# Patient Record
Sex: FEMALE | Race: WHITE | URBAN - METROPOLITAN AREA
[De-identification: names, ages, dates, MRNs, and addresses within clinical notes are randomized per-mention and may not be internally consistent; named-entity substitution may affect disease eponyms.]

---

## 2018-05-08 ENCOUNTER — HISTORICAL (OUTPATIENT)
Dept: RADIOLOGY | Facility: HOSPITAL | Age: 83
End: 2018-05-08

## 2018-05-09 ENCOUNTER — HISTORICAL (OUTPATIENT)
Dept: RADIOLOGY | Facility: HOSPITAL | Age: 83
End: 2018-05-09

## 2018-05-31 ENCOUNTER — HISTORICAL (OUTPATIENT)
Dept: INFUSION THERAPY | Facility: HOSPITAL | Age: 83
End: 2018-05-31

## 2018-06-26 ENCOUNTER — HISTORICAL (OUTPATIENT)
Dept: LAB | Facility: HOSPITAL | Age: 83
End: 2018-06-26

## 2019-01-24 ENCOUNTER — HISTORICAL (OUTPATIENT)
Dept: INFUSION THERAPY | Facility: HOSPITAL | Age: 84
End: 2019-01-24

## 2019-01-30 ENCOUNTER — HISTORICAL (OUTPATIENT)
Dept: INFUSION THERAPY | Facility: HOSPITAL | Age: 84
End: 2019-01-30

## 2019-02-06 ENCOUNTER — HISTORICAL (OUTPATIENT)
Dept: INFUSION THERAPY | Facility: HOSPITAL | Age: 84
End: 2019-02-06

## 2019-02-20 ENCOUNTER — HISTORICAL (OUTPATIENT)
Dept: INFUSION THERAPY | Facility: HOSPITAL | Age: 84
End: 2019-02-20

## 2019-02-22 ENCOUNTER — HISTORICAL (OUTPATIENT)
Dept: ADMINISTRATIVE | Facility: HOSPITAL | Age: 84
End: 2019-02-22

## 2019-03-13 ENCOUNTER — HISTORICAL (OUTPATIENT)
Dept: INFUSION THERAPY | Facility: HOSPITAL | Age: 84
End: 2019-03-13

## 2019-04-24 LAB
ABS NEUT (OLG): 6.64 X10(3)/MCL (ref 2.1–9.2)
BASOPHILS # BLD AUTO: 0 X10(3)/MCL (ref 0–0.2)
BASOPHILS NFR BLD AUTO: 0.2 %
CROSSMATCH INTERPRETATION: NORMAL
EOSINOPHIL # BLD AUTO: 0 X10(3)/MCL (ref 0–0.9)
EOSINOPHIL NFR BLD AUTO: 0.2 %
ERYTHROCYTE [DISTWIDTH] IN BLOOD BY AUTOMATED COUNT: 17.2 % (ref 11.5–17)
GROUP & RH: NORMAL
HCT VFR BLD AUTO: 19.2 % (ref 37–47)
HGB BLD-MCNC: 5.7 GM/DL (ref 12–16)
LYMPHOCYTES # BLD AUTO: 0.8 X10(3)/MCL (ref 0.6–4.6)
LYMPHOCYTES NFR BLD AUTO: 10 %
MCH RBC QN AUTO: 26.8 PG (ref 27–31)
MCHC RBC AUTO-ENTMCNC: 29.7 GM/DL (ref 33–36)
MCV RBC AUTO: 90.1 FL (ref 80–94)
MONOCYTES # BLD AUTO: 0.8 X10(3)/MCL (ref 0.1–1.3)
MONOCYTES NFR BLD AUTO: 9.1 %
NEUTROPHILS # BLD AUTO: 6.6 X10(3)/MCL (ref 2.1–9.2)
NEUTROPHILS NFR BLD AUTO: 80.3 %
PLATELET # BLD AUTO: 252 X10(3)/MCL (ref 130–400)
PMV BLD AUTO: 8 FL (ref 9.4–12.4)
PRODUCT READY: NORMAL
PRODUCT READY: NORMAL
RBC # BLD AUTO: 2.13 X10(6)/MCL (ref 4.2–5.4)
TRANSFUSION ORDER: NORMAL
TRANSFUSION ORDER: NORMAL
WBC # SPEC AUTO: 8.3 X10(3)/MCL (ref 4.5–11.5)

## 2019-04-25 ENCOUNTER — HISTORICAL (OUTPATIENT)
Dept: INFUSION THERAPY | Facility: HOSPITAL | Age: 84
End: 2019-04-25

## 2019-05-01 ENCOUNTER — HISTORICAL (OUTPATIENT)
Dept: ADMINISTRATIVE | Facility: HOSPITAL | Age: 84
End: 2019-05-01

## 2019-05-01 LAB
ABS NEUT (OLG): 6.9 X10(3)/MCL (ref 2.1–9.2)
ALBUMIN SERPL-MCNC: 1.9 GM/DL (ref 3.4–5)
ALBUMIN/GLOB SERPL: 0.5 RATIO (ref 1.1–2)
ALP SERPL-CCNC: 94 UNIT/L (ref 38–126)
ALT SERPL-CCNC: 35 UNIT/L (ref 12–78)
AST SERPL-CCNC: 50 UNIT/L (ref 15–37)
BASOPHILS # BLD AUTO: 0 X10(3)/MCL (ref 0–0.2)
BASOPHILS NFR BLD AUTO: 0.4 %
BILIRUB SERPL-MCNC: 0.6 MG/DL (ref 0.2–1)
BILIRUBIN DIRECT+TOT PNL SERPL-MCNC: 0.3 MG/DL (ref 0–0.5)
BILIRUBIN DIRECT+TOT PNL SERPL-MCNC: 0.3 MG/DL (ref 0–0.8)
BUN SERPL-MCNC: 13 MG/DL (ref 7–18)
CALCIUM SERPL-MCNC: 9 MG/DL (ref 8.5–10.1)
CHLORIDE SERPL-SCNC: 95 MMOL/L (ref 98–107)
CO2 SERPL-SCNC: 33 MMOL/L (ref 21–32)
CREAT SERPL-MCNC: 0.42 MG/DL (ref 0.55–1.02)
EOSINOPHIL # BLD AUTO: 0 X10(3)/MCL (ref 0–0.9)
EOSINOPHIL NFR BLD AUTO: 0.2 %
ERYTHROCYTE [DISTWIDTH] IN BLOOD BY AUTOMATED COUNT: 15.8 % (ref 11.5–17)
GLOBULIN SER-MCNC: 3.5 GM/DL (ref 2.4–3.5)
GLUCOSE SERPL-MCNC: 130 MG/DL (ref 74–106)
HCT VFR BLD AUTO: 31.9 % (ref 37–47)
HGB BLD-MCNC: 9.7 GM/DL (ref 12–16)
LYMPHOCYTES # BLD AUTO: 0.8 X10(3)/MCL (ref 0.6–4.6)
LYMPHOCYTES NFR BLD AUTO: 9.4 %
MCH RBC QN AUTO: 27.6 PG (ref 27–31)
MCHC RBC AUTO-ENTMCNC: 30.4 GM/DL (ref 33–36)
MCV RBC AUTO: 90.6 FL (ref 80–94)
MONOCYTES # BLD AUTO: 0.8 X10(3)/MCL (ref 0.1–1.3)
MONOCYTES NFR BLD AUTO: 9 %
NEUTROPHILS # BLD AUTO: 6.9 X10(3)/MCL (ref 2.1–9.2)
NEUTROPHILS NFR BLD AUTO: 80.6 %
PLATELET # BLD AUTO: 266 X10(3)/MCL (ref 130–400)
PMV BLD AUTO: 8.2 FL (ref 9.4–12.4)
POTASSIUM SERPL-SCNC: 3.6 MMOL/L (ref 3.5–5.1)
PROT SERPL-MCNC: 5.4 GM/DL (ref 6.4–8.2)
RBC # BLD AUTO: 3.52 X10(6)/MCL (ref 4.2–5.4)
SODIUM SERPL-SCNC: 134 MMOL/L (ref 136–145)
WBC # SPEC AUTO: 8.6 X10(3)/MCL (ref 4.5–11.5)

## 2019-05-08 ENCOUNTER — HISTORICAL (OUTPATIENT)
Dept: INFUSION THERAPY | Facility: HOSPITAL | Age: 84
End: 2019-05-08

## 2019-05-08 LAB
ABS NEUT (OLG): 7.59 X10(3)/MCL (ref 2.1–9.2)
BASOPHILS # BLD AUTO: 0 X10(3)/MCL (ref 0–0.2)
BASOPHILS NFR BLD AUTO: 0.2 %
EOSINOPHIL # BLD AUTO: 0 X10(3)/MCL (ref 0–0.9)
EOSINOPHIL NFR BLD AUTO: 0.1 %
ERYTHROCYTE [DISTWIDTH] IN BLOOD BY AUTOMATED COUNT: 16 % (ref 11.5–17)
HCT VFR BLD AUTO: 33.1 % (ref 37–47)
HGB BLD-MCNC: 10.1 GM/DL (ref 12–16)
LYMPHOCYTES # BLD AUTO: 1.4 X10(3)/MCL (ref 0.6–4.6)
LYMPHOCYTES NFR BLD AUTO: 14.3 %
MCH RBC QN AUTO: 26.7 PG (ref 27–31)
MCHC RBC AUTO-ENTMCNC: 30.5 GM/DL (ref 33–36)
MCV RBC AUTO: 87.6 FL (ref 80–94)
MONOCYTES # BLD AUTO: 0.8 X10(3)/MCL (ref 0.1–1.3)
MONOCYTES NFR BLD AUTO: 7.9 %
NEUTROPHILS # BLD AUTO: 7.6 X10(3)/MCL (ref 2.1–9.2)
NEUTROPHILS NFR BLD AUTO: 77.2 %
PLATELET # BLD AUTO: 348 X10(3)/MCL (ref 130–400)
PMV BLD AUTO: 7.8 FL (ref 9.4–12.4)
RBC # BLD AUTO: 3.78 X10(6)/MCL (ref 4.2–5.4)
WBC # SPEC AUTO: 9.8 X10(3)/MCL (ref 4.5–11.5)

## 2019-05-22 ENCOUNTER — HISTORICAL (OUTPATIENT)
Dept: INFUSION THERAPY | Facility: HOSPITAL | Age: 84
End: 2019-05-22

## 2019-05-22 LAB
ABS NEUT (OLG): 8.94 X10(3)/MCL (ref 2.1–9.2)
BASOPHILS # BLD AUTO: 0 X10(3)/MCL (ref 0–0.2)
BASOPHILS NFR BLD AUTO: 0.1 %
ERYTHROCYTE [DISTWIDTH] IN BLOOD BY AUTOMATED COUNT: 16.9 % (ref 11.5–17)
HCT VFR BLD AUTO: 35.3 % (ref 37–47)
HGB BLD-MCNC: 10.6 GM/DL (ref 12–16)
LYMPHOCYTES # BLD AUTO: 0.9 X10(3)/MCL (ref 0.6–4.6)
LYMPHOCYTES NFR BLD AUTO: 8.2 %
MCH RBC QN AUTO: 25.8 PG (ref 27–31)
MCHC RBC AUTO-ENTMCNC: 30 GM/DL (ref 33–36)
MCV RBC AUTO: 85.9 FL (ref 80–94)
MONOCYTES # BLD AUTO: 0.7 X10(3)/MCL (ref 0.1–1.3)
MONOCYTES NFR BLD AUTO: 6.7 %
NEUTROPHILS # BLD AUTO: 8.9 X10(3)/MCL (ref 2.1–9.2)
NEUTROPHILS NFR BLD AUTO: 84.7 %
PLATELET # BLD AUTO: 256 X10(3)/MCL (ref 130–400)
PMV BLD AUTO: 8.8 FL (ref 9.4–12.4)
RBC # BLD AUTO: 4.11 X10(6)/MCL (ref 4.2–5.4)
WBC # SPEC AUTO: 10.6 X10(3)/MCL (ref 4.5–11.5)

## 2019-05-30 ENCOUNTER — HISTORICAL (OUTPATIENT)
Dept: ADMINISTRATIVE | Facility: HOSPITAL | Age: 84
End: 2019-05-30

## 2019-05-30 LAB
ABS NEUT (OLG): 7.32 X10(3)/MCL (ref 2.1–9.2)
ALBUMIN SERPL-MCNC: 1.7 GM/DL (ref 3.4–5)
ALBUMIN/GLOB SERPL: 0.5 RATIO (ref 1.1–2)
ALP SERPL-CCNC: 96 UNIT/L (ref 38–126)
ALT SERPL-CCNC: 26 UNIT/L (ref 12–78)
AST SERPL-CCNC: 60 UNIT/L (ref 15–37)
BILIRUB SERPL-MCNC: 0.6 MG/DL (ref 0.2–1)
BILIRUBIN DIRECT+TOT PNL SERPL-MCNC: 0.3 MG/DL (ref 0–0.5)
BILIRUBIN DIRECT+TOT PNL SERPL-MCNC: 0.3 MG/DL (ref 0–0.8)
BUN SERPL-MCNC: 18 MG/DL (ref 7–18)
CALCIUM SERPL-MCNC: 8.7 MG/DL (ref 8.5–10.1)
CHLORIDE SERPL-SCNC: 99 MMOL/L (ref 98–107)
CO2 SERPL-SCNC: 28 MMOL/L (ref 21–32)
CREAT SERPL-MCNC: 0.61 MG/DL (ref 0.55–1.02)
ERYTHROCYTE [DISTWIDTH] IN BLOOD BY AUTOMATED COUNT: 17.7 % (ref 11.5–17)
GLOBULIN SER-MCNC: 3.7 GM/DL (ref 2.4–3.5)
GLUCOSE SERPL-MCNC: 208 MG/DL (ref 74–106)
HCT VFR BLD AUTO: 31.4 % (ref 37–47)
HGB BLD-MCNC: 9.4 GM/DL (ref 12–16)
LYMPHOCYTES # BLD AUTO: 0.4 X10(3)/MCL (ref 0.6–4.6)
LYMPHOCYTES NFR BLD AUTO: 5.3 %
MCH RBC QN AUTO: 25.8 PG (ref 27–31)
MCHC RBC AUTO-ENTMCNC: 29.9 GM/DL (ref 33–36)
MCV RBC AUTO: 86.3 FL (ref 80–94)
MONOCYTES # BLD AUTO: 0.5 X10(3)/MCL (ref 0.1–1.3)
MONOCYTES NFR BLD AUTO: 6.5 %
NEUTROPHILS # BLD AUTO: 7.3 X10(3)/MCL (ref 2.1–9.2)
NEUTROPHILS NFR BLD AUTO: 88 %
PLATELET # BLD AUTO: 238 X10(3)/MCL (ref 130–400)
PMV BLD AUTO: 8.5 FL (ref 9.4–12.4)
POTASSIUM SERPL-SCNC: 3.2 MMOL/L (ref 3.5–5.1)
PROT SERPL-MCNC: 5.4 GM/DL (ref 6.4–8.2)
RBC # BLD AUTO: 3.64 X10(6)/MCL (ref 4.2–5.4)
SODIUM SERPL-SCNC: 137 MMOL/L (ref 136–145)
WBC # SPEC AUTO: 8.3 X10(3)/MCL (ref 4.5–11.5)

## 2019-06-05 ENCOUNTER — HISTORICAL (OUTPATIENT)
Dept: HEMATOLOGY/ONCOLOGY | Facility: CLINIC | Age: 84
End: 2019-06-05

## 2019-06-05 LAB
ABS NEUT (OLG): 8.66 X10(3)/MCL (ref 2.1–9.2)
EOSINOPHIL # BLD AUTO: 0 X10(3)/MCL (ref 0–0.9)
EOSINOPHIL NFR BLD AUTO: 0.1 %
ERYTHROCYTE [DISTWIDTH] IN BLOOD BY AUTOMATED COUNT: 18.3 % (ref 11.5–17)
HCT VFR BLD AUTO: 32.3 % (ref 37–47)
HGB BLD-MCNC: 9.6 GM/DL (ref 12–16)
LYMPHOCYTES # BLD AUTO: 0.9 X10(3)/MCL (ref 0.6–4.6)
LYMPHOCYTES NFR BLD AUTO: 8.4 %
MCH RBC QN AUTO: 25.4 PG (ref 27–31)
MCHC RBC AUTO-ENTMCNC: 29.7 GM/DL (ref 33–36)
MCV RBC AUTO: 85.4 FL (ref 80–94)
MONOCYTES # BLD AUTO: 0.7 X10(3)/MCL (ref 0.1–1.3)
MONOCYTES NFR BLD AUTO: 6.5 %
NEUTROPHILS # BLD AUTO: 8.7 X10(3)/MCL (ref 2.1–9.2)
NEUTROPHILS NFR BLD AUTO: 84.7 %
PLATELET # BLD AUTO: 244 X10(3)/MCL (ref 130–400)
PMV BLD AUTO: 8.8 FL (ref 9.4–12.4)
RBC # BLD AUTO: 3.78 X10(6)/MCL (ref 4.2–5.4)
WBC # SPEC AUTO: 10.2 X10(3)/MCL (ref 4.5–11.5)

## 2019-06-19 LAB
ABS NEUT (OLG): 9.22 X10(3)/MCL (ref 2.1–9.2)
BASOPHILS # BLD AUTO: 0 X10(3)/MCL (ref 0–0.2)
BASOPHILS NFR BLD AUTO: 0.1 %
CROSSMATCH INTERPRETATION: NORMAL
ERYTHROCYTE [DISTWIDTH] IN BLOOD BY AUTOMATED COUNT: 20.1 % (ref 11.5–17)
GROUP & RH: NORMAL
HCT VFR BLD AUTO: 22.9 % (ref 37–47)
HGB BLD-MCNC: 6.6 GM/DL (ref 12–16)
LYMPHOCYTES # BLD AUTO: 0.6 X10(3)/MCL (ref 0.6–4.6)
LYMPHOCYTES NFR BLD AUTO: 5.5 %
MCH RBC QN AUTO: 25.6 PG (ref 27–31)
MCHC RBC AUTO-ENTMCNC: 28.8 GM/DL (ref 33–36)
MCV RBC AUTO: 88.8 FL (ref 80–94)
MONOCYTES # BLD AUTO: 0.6 X10(3)/MCL (ref 0.1–1.3)
MONOCYTES NFR BLD AUTO: 6 %
NEUTROPHILS # BLD AUTO: 9.2 X10(3)/MCL (ref 2.1–9.2)
NEUTROPHILS NFR BLD AUTO: 88.1 %
PLATELET # BLD AUTO: 253 X10(3)/MCL (ref 130–400)
PMV BLD AUTO: 9.4 FL (ref 9.4–12.4)
PRODUCT READY: NORMAL
RBC # BLD AUTO: 2.58 X10(6)/MCL (ref 4.2–5.4)
TRANSFUSION ORDER: NORMAL
WBC # SPEC AUTO: 10.5 X10(3)/MCL (ref 4.5–11.5)

## 2019-06-20 ENCOUNTER — HISTORICAL (OUTPATIENT)
Dept: INFUSION THERAPY | Facility: HOSPITAL | Age: 84
End: 2019-06-20

## 2019-07-03 LAB
ABS NEUT (OLG): 9.58 X10(3)/MCL (ref 2.1–9.2)
BASOPHILS # BLD AUTO: 0.1 X10(3)/MCL (ref 0–0.2)
BASOPHILS NFR BLD AUTO: 0.9 %
CROSSMATCH INTERPRETATION: NORMAL
EOSINOPHIL # BLD AUTO: 0 X10(3)/MCL (ref 0–0.9)
EOSINOPHIL NFR BLD AUTO: 0.3 %
ERYTHROCYTE [DISTWIDTH] IN BLOOD BY AUTOMATED COUNT: 19.2 % (ref 11.5–17)
GROUP & RH: NORMAL
HCT VFR BLD AUTO: 27 % (ref 37–47)
HGB BLD-MCNC: 7.6 GM/DL (ref 12–16)
LYMPHOCYTES # BLD AUTO: 0.9 X10(3)/MCL (ref 0.6–4.6)
LYMPHOCYTES NFR BLD AUTO: 8 %
MCH RBC QN AUTO: 25.9 PG (ref 27–31)
MCHC RBC AUTO-ENTMCNC: 28.1 GM/DL (ref 33–36)
MCV RBC AUTO: 92.2 FL (ref 80–94)
MONOCYTES # BLD AUTO: 0.7 X10(3)/MCL (ref 0.1–1.3)
MONOCYTES NFR BLD AUTO: 5.8 %
NEUTROPHILS # BLD AUTO: 9.6 X10(3)/MCL (ref 2.1–9.2)
NEUTROPHILS NFR BLD AUTO: 84.7 %
PLATELET # BLD AUTO: 253 X10(3)/MCL (ref 130–400)
PMV BLD AUTO: 8.8 FL (ref 9.4–12.4)
PRODUCT READY: NORMAL
RBC # BLD AUTO: 2.93 X10(6)/MCL (ref 4.2–5.4)
TRANSFUSION ORDER: NORMAL
WBC # SPEC AUTO: 11.3 X10(3)/MCL (ref 4.5–11.5)

## 2019-07-05 ENCOUNTER — HISTORICAL (OUTPATIENT)
Dept: INFUSION THERAPY | Facility: HOSPITAL | Age: 84
End: 2019-07-05

## 2019-07-17 ENCOUNTER — HISTORICAL (OUTPATIENT)
Dept: INFUSION THERAPY | Facility: HOSPITAL | Age: 84
End: 2019-07-17

## 2019-07-17 LAB
ABS NEUT (OLG): 7.94 X10(3)/MCL (ref 2.1–9.2)
BASOPHILS # BLD AUTO: 0 X10(3)/MCL (ref 0–0.2)
BASOPHILS NFR BLD AUTO: 0.1 %
CROSSMATCH INTERPRETATION: NORMAL
EOSINOPHIL # BLD AUTO: 0 X10(3)/MCL (ref 0–0.9)
EOSINOPHIL NFR BLD AUTO: 0.1 %
ERYTHROCYTE [DISTWIDTH] IN BLOOD BY AUTOMATED COUNT: 18.4 % (ref 11.5–17)
GROUP & RH: NORMAL
HCT VFR BLD AUTO: 24.8 % (ref 37–47)
HGB BLD-MCNC: 7.2 GM/DL (ref 12–16)
LYMPHOCYTES # BLD AUTO: 0.7 X10(3)/MCL (ref 0.6–4.6)
LYMPHOCYTES NFR BLD AUTO: 7.2 %
MCH RBC QN AUTO: 27 PG (ref 27–31)
MCHC RBC AUTO-ENTMCNC: 29 GM/DL (ref 33–36)
MCV RBC AUTO: 92.9 FL (ref 80–94)
MONOCYTES # BLD AUTO: 0.6 X10(3)/MCL (ref 0.1–1.3)
MONOCYTES NFR BLD AUTO: 6.4 %
NEUTROPHILS # BLD AUTO: 7.9 X10(3)/MCL (ref 2.1–9.2)
NEUTROPHILS NFR BLD AUTO: 85.9 %
PLATELET # BLD AUTO: 240 X10(3)/MCL (ref 130–400)
PMV BLD AUTO: 8.7 FL (ref 9.4–12.4)
PRODUCT READY: NORMAL
RBC # BLD AUTO: 2.67 X10(6)/MCL (ref 4.2–5.4)
TRANSFUSION ORDER: NORMAL
WBC # SPEC AUTO: 9.2 X10(3)/MCL (ref 4.5–11.5)

## 2019-07-31 ENCOUNTER — HISTORICAL (OUTPATIENT)
Dept: INFUSION THERAPY | Facility: HOSPITAL | Age: 84
End: 2019-07-31

## 2019-08-14 ENCOUNTER — HISTORICAL (OUTPATIENT)
Dept: INFUSION THERAPY | Facility: HOSPITAL | Age: 84
End: 2019-08-14

## 2022-04-28 NOTE — PROGRESS NOTES
Patient:   Sylvia Palacios             MRN: [id number]            FIN: [id number]               Age:   89 years     Sex:  Female     :  1929   Associated Diagnoses:   Anemia   Author:   Tamara Corbin MD      Visit Information   Initial Consultation:18  Referring Physician:Dr Winters  Other Physicians:  Code Status:Not addressed    Diagnosis/Problem list:   Iron Deficiency Anemia    Present Treatment:    Treatment history:      Plan of care: Anemia w/u    Clinical History: Mrs. Palacios is an 89-year-old female referred by Dr. Winters for anemia. She has a past medical history of rheumatoid arthritis, coronary artery disease, hypertension and TIA.    In May of this year, she presented with left-sided chest pain and dark stools. Cardiac symptoms are negative. She was transferred from the [facility] to our [facility] for GI workup. She reported black stools during the month prior. She was taking oral iron which she had been on for years but the dark stools relatively new. She is also taking 325 mg of aspirin daily EGD that admission was normal and colonoscopy found moderate diverticulosis, no bleeding, masses or polyps seen. There were small external hemorrhoids. During that admission in May 2018, hemoglobin ranged from 7.6-8.6 g/dL and hemoglobin was 8.1 g/dL at the time of discharge on 2018. MCV was normal in the mid 80s. Iron level was 16, percent saturation 5%, iron binding capacity was normal at 355 and ferritin was normal at 30.3. She did receive Injectafer for infusion while in the hospital and received another one a week or so after discharge.    In early 2018, reportedly she started having dark stools again. Labs from 2018 show hemoglobin of 8.9 which was improved from hemoglobin of 8.1 g/dL when she was discharged from hospital one week prior to that. On 2018, the patient saw Dr. Schaefer's PA and the plan was to do M2A capsule study.     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     Denies any complaints at all. She is doing well "for my age". She denies any fever, chills, sweats. No chest pain or shortness of breath. She does report dark stools from time to time. No chest pain or shortness of breath. A little bit of fatigue and tiredness.      Initial workup:MM work up was negative. Lupus and RIKI both positive.   Peripheral smear: Moderate normocytic, normochromic anemia with little variation in red cell size and shape. Red cell morphologic variants include ovalocytes and acanthocytes. No schistocytes are seen. Probably from Kathy is present. Leukocytes are normal in number, differential and morphology. A few reactive lymphocytes are present.  The normal in number and morphology. Comment: Normocystosis in the setting of a normal RDW may be seen with anemia of chronic disease, some hemoglobinopathy traits acute bleed.    She had an EGD with  M2A capsule study in August 2018. When we saw her initially, we did not have these results at that time. 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    She was transfused 2 units of PRBCs ON 1/24/19 and followed by Injectafer on 1/30/19 & 2/6/19.      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 She does see minimal amounts of blood in her stool intermittently. Iron profile indicates anemia of chronic disease.      Chief Complaint   5/1/2019 9:45 CDT        Tiredness. left knee pain      Interval History   Patient presents today for her follow-up visit accompanied by her daughter.    Bone marrow biopsy 2/22/19: Normocytic anemia with 30% cellular bone marrow showing mild erythroid hyperplasia and mildly increased marrow iron stores suggestive of anemia of chronic disease. No evidence of definitive myeloma,  dysplasia or increased blast identified. MDS Fish panel negative for MDS related genetic abnormality.     Today, her hemoglobin is 9.7.  She still complains of fatigue.  " She has not seen any bright red blood per rectum lately, but son and daughter report that she is always having black stool and some blood. Since last visit she was admitted to the hospital, [facility], in 3/19,  for UTI. She has transfusion and iron as well there. I do not have the records. About a week or so after she was admitted again but for heart failure and she is on Digoxin, lasix and metoprolol now.   She was transfused 3 units of PRBCs 4/24/19.  Patient denies any fever, chills, sweats.  She reports left knee pain that this is chronic and is not new.  Patient looks debilitated and frail.  She is in a wheelchair.  She reports that she does not do much anymore.  Seems like she is having a little bit that was evident through the interview.  History taken from her daughter and son      Review of Systems   All 12 points of the review of systems were obtained and pertinent as per above history      Health Status   Allergies:    Allergic Reactions (All)  No Known Medication Allergies,    Allergies (1) Active Reaction  No Known Medication Allergies None Documented   Current medications:  (Selected)   Outpatient Medications  Ordered  Benadryl (for IVPB): 25 mg, IV Piggyback, Once-chemo, Infuse over: 20 minute(s), first dose 04/25/19 10:46:00 CDT, stop date 04/25/19 10:46:00 CDT, Pre-Med for transfusion, Days 1  Tylenol: 650 mg, form: Tab, Oral, Once-chemo, first dose 01/24/19 12:00:00 CST, stop date 01/24/19 12:00:00 CST, Routine, Pre-Med for Transfusion, Days 1  Future  Aranesp inj. - CCA: 300 mcg, form: Soln, Subcutaneous, Once-chemo, first dose 05/08/19 11:00:00 CDT, stop date 05/08/19 11:00:00 CDT, Future Order, Weeks 5  Aranesp inj. - CCA: 300 mcg, form: Soln, Subcutaneous, Once-chemo, first dose 05/22/19 11:00:00 CDT, stop date 05/22/19 11:00:00 CDT, Future Order, Weeks 7  Aranesp inj. - CCA: 300 mcg, form: Soln, Subcutaneous, Once-chemo, first dose 06/05/19 11:00:00 CDT, stop date 06/05/19 11:00:00 CDT, Future  Order, Weeks 9  Aranesp inj. - CCA: 300 mcg, form: Soln, Subcutaneous, Once-chemo, first dose 06/19/19 11:00:00 CDT, stop date 06/19/19 11:00:00 CDT, Future Order, Weeks 11  Documented Medications  Documented  Calcitrate with D: 0 Refill(s)  Lasix: 0 Refill(s)  Metoprolol Tartrate: 0 Refill(s)  ProAir HFA 90 mcg/inh inhalation aerosol:   TNF Medl (Template NonFormulary): 0 Refill(s)  TNF Medl (Template NonFormulary): 0 Refill(s)  TNF Medl (Template NonFormulary): 0 Refill(s)  TNF Medl (Template NonFormulary): 0 Refill(s)  TNF Medl (Template NonFormulary): 0 Refill(s)  digoxin 125 mcg (0.125 mg) oral tablet: 0 Refill(s)  fluticasone 50 mcg/inh nasal spray: ,   Reviewed   Problem list:    All Problems  Review of care plan / SNOMED CT 5985867864 / Confirmed  Resolved: HTN - Hypertension / SNOMED CT 4501277669,    Active Problems (1)  Review of care plan       Histories   Past Medical History:    Resolved  HTN - Hypertension ([id number]):  Resolved.   Family History:    Diabetes mellitus type 2  Mother  ,   brother-throat cancer  daughter-lung cancer   Procedure history:    Biopsy Bone Marrow Aspiration (.) on 2/22/2019 at 89 Years.  Comments:  2/22/2019 12:27 - [facility], Emily Mosley  auto-populated from documented surgical case  tnsillectomy - 12yo.   Social History        Social & Psychosocial Habits    Alcohol  08/09/2018  Use: Past    Tobacco  05/01/2019  Use: Never (less than 100 in l    Patient Wants Consult For Cessation Counseling N/A.        Physical Examination   Vital Signs   5/1/2019 9:45 CDT        Temperature Oral          36.6 DegC                             Temperature Oral (calculated)             97.88 DegF                             Peripheral Pulse Rate     92 bpm                             Systolic Blood Pressure   104 mmHg                             Diastolic Blood Pressure  47 mmHg  LOW     Measurements from flowsheet : Measurements   5/1/2019 9:45 CDT        Weight Dosing              41.5 kg                             Weight Measured           41.5 kg                             Weight Measured and Calculated in Lbs     91.49 lb                             Height/Length Dosing      161 cm                             Height/Length Measured    161 cm                             BSA Measured              1.36 m2                             Body Mass Index Measured  16.01 kg/m2     General:  Alert and oriented,  pale. Generalized weakness., Frail appearing elderly woman, On a wheelchair.    Eye:  Pupils are equal, round and reactive to light, Extraocular movements are intact, Normal conjunctiva.    HENT:  Normocephalic, Oral mucosa is moist, No pharyngeal erythema, No sinus tenderness.    Neck:  Supple, No jugular venous distention, No lymphadenopathy.    Respiratory:  Lungs are clear to auscultation, Respirations are non-labored.    Cardiovascular:  Normal rate, Regular rhythm, No edema.    Gastrointestinal:  Soft, Non-tender, Non-distended, Normal bowel sounds, No organomegaly.    Genitourinary:  No costovertebral angle tenderness, No inguinal tenderness.    Lymphatics:  No lymphadenopathy neck, axilla, groin.    Musculoskeletal:       Mobility/ gait: Able to walk with maximal assistance.         Spine/torso exam: Thoracic ( Kyphosis ).    Integumentary:  Warm, Dry, Pallor.         Integumentary exam: Jaundiced, Pale.    Neurologic:  Alert, Oriented, No focal deficits, Cranial Nerves II-XII are grossly intact.    Psychiatric:  Cooperative, Non-suicidal.    ECOG Performance Scale: 3 - Capable of only limited self-care; confined to bed or chair greater than 50 percent of waking hours.      Review / Management   Radiology Reports:      Pathology Reports:        Results review:  Lab results   5/1/2019 9:31 CDT        RBC                       3.52 x10(6)/mcL  LOW                             Hgb                       9.7 gm/dL  LOW                             Hct                       31.9 %  LOW                              Platelet                  266 x10(3)/mcL                             MCV                       90.6 fL                             MCH                       27.6 pg                             MCHC                      30.4 gm/dL  LOW                             RDW                       15.8 %                             MPV                       8.2 fL  LOW                             Abs Neut                  6.90 x10(3)/mcL                             NEUT%                     80.6 %  NA                             NEUT#                     6.9 x10(3)/mcL                             LYMPH%                    9.4 %  NA                             LYMPH#                    0.8 x10(3)/mcL                             MONO%                     9.0 %  NA                             MONO#                     0.8 x10(3)/mcL                             EOS%                      0.2 %  NA                             EOS#                      0.0 x10(3)/mcL                             BASO%                     0.4 %  NA                             BASO#                     0.0 x10(3)/mcL  .       Impression and Plan   Diagnosis     Anemia (GUF16-LV D64.9).       IMPRESSION:    Anemia of chronic disease  M2A capsule study in August 2018 revealed AVM to stomach duodenum and jejunum.  RA  HTN  H/O CVA    PLAN    Anemia better today.   Bone marrow biopsy c/w Anemia of chronic disease.  She has hx of rheumatoid arthritis. Iron profile consistent with anemia of chronic disease.  Cont Aranesp 300 mcg [id number] for hgb <11  RTC in 4 weeks with labs.   CBC, CMP iron profile and ferritin  CBC weekly  Transfuse for Hb < 8.5  Transfusion needs to be slowly due to CHF and will need Lasix after  encourage to call for any questions or problems    All of the above discussed with the patient that was given ample opportunity to ask questions and they were all answered to satisfaction; patient demonstrated understanding  of what we discussed and is agreeable to the plan.    IRVING CUMMINGS MD

## 2022-04-28 NOTE — PROGRESS NOTES
Patient presents today for labs only. Her Hgb is 6.6. She was supposed to get Aranesp on 6/5/19 but for some reason she did not not. We will transfuse 2 units of PRBCs today and give Aranesp. Continue Aranesp q2 weeks for Hgb <11.

## 2022-04-28 NOTE — PROGRESS NOTES
Patient:   Sylvia Palacios             MRN: [id number]            FIN: [id number]               Age:   89 years     Sex:  Female     :  1929   Associated Diagnoses:   Anemia   Author:   Laith CRISOSTOMO, Mayra KIM      Visit Information   Initial Consultation:18  Referring Physician:Dr Winters  Other Physicians:  Code Status:Not addressed    Diagnosis/Problem list:   Anemia of chronic disease    Present Treatment:  Aranesp every 2 weeks for hemoglobin less than 11. Started on 2019.    Treatment history:      Plan of care: Anemia w/u    Clinical History: Mrs. Palacios is an 89-year-old female referred by Dr. Winters for anemia. She has a past medical history of rheumatoid arthritis, coronary artery disease, hypertension and TIA.    In May of this year, she presented with left-sided chest pain and dark stools. Cardiac symptoms are negative. She was [facility] the [facility] for [facility] workup. She reported black stools during the month prior. She was taking oral iron which she had been on for years but the dark stools relatively new. She is also taking 325 mg of aspirin daily EGD that admission was normal and colonoscopy found moderate diverticulosis, no bleeding, masses or polyps seen. There were small external hemorrhoids. During that admission in May 2018, hemoglobin ranged from 7.6-8.6 g/dL and hemoglobin was 8.1 g/dL at the time of discharge on 2018. MCV was normal in the mid 80s. Iron level was 16, percent saturation 5%, iron binding capacity was normal at 355 and ferritin was normal at 30.3. She did receive Injectafer for infusion while in the hospital and received another one a week or so after discharge.    In early 2018, reportedly she started having dark stools again. Labs from 2018 show hemoglobin of 8.9 which was improved from hemoglobin of 8.1 g/dL when she was discharged from hospital one week prior to that. On 2018, the patient saw Dr. Schaefer's PA  "and the plan was to do M2A capsule study.     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     Denies any complaints at all. She is doing well "for my age". She denies any fever, chills, sweats. No chest pain or shortness of breath. She does report dark stools from time to time. No chest pain or shortness of breath. A little bit of fatigue and tiredness.      Initial workup:MM work up was negative. Lupus and RIKI both positive.   Peripheral smear: Moderate normocytic, normochromic anemia with little variation in red cell size and shape. Red cell morphologic variants include ovalocytes and acanthocytes. No schistocytes are seen. Probably from Kathy is present. Leukocytes are normal in number, differential and morphology. A few reactive lymphocytes are present.  The normal in number and morphology. Comment: Normocystosis in the setting of a normal RDW may be seen with anemia of chronic disease, some hemoglobinopathy traits acute bleed.    She had an EGD with  M2A capsule study in August 2018. When we saw her initially, we did not have these results at that time. 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    She was transfused 2 units of PRBCs ON 1/24/19 and followed by Injectafer on 1/30/19 & 2/6/19.      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 She does see minimal amounts of blood in her stool intermittently. Iron profile indicates anemia of chronic disease    Bone marrow biopsy 2/22/19: Normocytic anemia with 30% cellular bone marrow showing mild erythroid hyperplasia and mildly increased marrow iron stores suggestive of anemia of chronic disease. No evidence of definitive myeloma,  dysplasia or increased blast identified. MDS Fish panel negative for MDS related genetic abnormality.          Chief Complaint      Interval History   Patient presents today for her follow-up visit accompanied by her daughter and her daughter's boyfriend. In the interim, " she fell at home and hit her head on the concrete floor. She developed a subdural hematoma and was admitted to the hospital. Her blood thinners were held for 2 weeks. She was transfused 2 units while she was hospitalized.  Last received Aranesp on 5/22/2019. Her hemoglobin and hematocrit is 9.4 and 31.4 today.  She is losing weight. She says that she is not hungry. She has 2+ pitting edema to her lobe showed his bilaterally. We did discuss hospice when she was here last for labs but her daughter is not ready for that.      Review of Systems   All 12 points of the review of systems were obtained and pertinent as per above history      Health Status   Allergies:    Allergic Reactions (All)  No Known Medication Allergies,    Allergies (1) Active Reaction  No Known Medication Allergies None Documented   Current medications:  (Selected)   Outpatient Medications  Ordered  Benadryl (for IVPB): 25 mg, IV Piggyback, Once-chemo, Infuse over: 20 minute(s), first dose 04/25/19 10:46:00 CDT, stop date 04/25/19 10:46:00 CDT, Pre-Med for transfusion, Days 1  Tylenol: 650 mg, form: Tab, Oral, Once-chemo, first dose 01/24/19 12:00:00 CST, stop date 01/24/19 12:00:00 CST, Routine, Pre-Med for Transfusion, Days 1  Future  Aranesp inj. - CCA: 300 mcg, form: Soln, Subcutaneous, Once-chemo, first dose 06/05/19 11:00:00 CDT, stop date 06/05/19 11:00:00 CDT, Future Order, Weeks 9  Aranesp inj. - CCA: 300 mcg, form: Soln, Subcutaneous, Once-chemo, first dose 06/19/19 11:00:00 CDT, stop date 06/19/19 11:00:00 CDT, Future Order, Weeks 11  Prescriptions  Prescribed  Keppra 500 mg oral tablet: 500 mg = 1 tab(s), Oral, BID, # 60 tab(s), 0 Refill(s), Pharmacy: [id number]  Documented Medications  Documented  Calcitrate with D: 0 Refill(s)  albuterol 0.083% inhalation solution:   digoxin 125 mcg (0.125 mg) oral tablet: 0 Refill(s)  ferrous sulfate 324 mg (65 mg elemental iron) oral delayed release tablet: Oral, TID  fluticasone 50 mcg/inh  nasal spray:   furosemide 20 mg oral tablet: 20 mg = 1 tab(s), Oral, Daily  megestrol 20 mg oral tablet:   metoprolol tartrate 25 mg oral tab: ,   Reviewed   Problem list:    All Problems  Review of care plan / SNOMED CT 6250170794 / Confirmed  Resolved: HTN - Hypertension / SNOMED CT 8523513298  Canceled: Review of care plan / SNOMED CT 1137796909,    Active Problems (1)  Review of care plan       Histories   Past Medical History:    Resolved  HTN - Hypertension ([id number]):  Resolved.   Family History:    Diabetes mellitus type 2  Mother  ,   brother-throat cancer  daughter-lung cancer   Procedure history:    Biopsy Bone Marrow Aspiration (.) on 2/22/2019 at 89 Years.  Comments:  2/22/2019 12:27 - Venecia CULP, Emily Mosley  auto-populated from documented surgical case  tnsillectomy - 12yo.   Social History        Social & Psychosocial Habits    Alcohol  08/09/2018  Use: Past    Substance Abuse  05/12/2019  Use: Never    Tobacco  05/01/2019  Use: Never (less than 100 in l    Patient Wants Consult For Cessation Counseling N/A    05/08/2019  Use: Never (less than 100 in l    Patient Wants Consult For Cessation Counseling N/A    05/12/2019  Use: Never (less than 100 in l    Patient Wants Consult For Cessation Counseling N/A    05/22/2019  Use: Never (less than 100 in l    Patient Wants Consult For Cessation Counseling No.        Physical Examination   Vital Signs   5/30/2019 11:18 CDT      Temperature Oral          36.9 DegC                             Temperature Oral (calculated)             98.42 DegF                             Peripheral Pulse Rate     89 bpm                             Systolic Blood Pressure   102 mmHg                             Diastolic Blood Pressure  52 mmHg  LOW     General:  Alert and oriented,  pale. Generalized weakness., Frail appearing elderly woman, On a wheelchair.    Eye:  Pupils are equal, round and reactive to light, Extraocular movements are intact, Normal conjunctiva.     HENT:  Normocephalic, Oral mucosa is moist, No pharyngeal erythema, No sinus tenderness.    Neck:  Supple, No jugular venous distention, No lymphadenopathy.    Respiratory:  Lungs are clear to auscultation, Respirations are non-labored.    Cardiovascular:  Normal rate, Regular rhythm, No edema.    Gastrointestinal:  Soft, Non-tender, Non-distended, Normal bowel sounds, No organomegaly.    Genitourinary:  No costovertebral angle tenderness, No inguinal tenderness.    Lymphatics:  No lymphadenopathy neck, axilla, groin.    Musculoskeletal:       Mobility/ gait: Able to walk with maximal assistance.         Spine/torso exam: Thoracic ( Kyphosis ).    Integumentary:  Warm, Dry, Pallor.         Integumentary exam: Jaundiced, Pale.    Neurologic:  Alert, Oriented, No focal deficits, Cranial Nerves II-XII are grossly intact.    Psychiatric:  Cooperative, Non-suicidal.    ECOG Performance Scale: 3 - Capable of only limited self-care; confined to bed or chair greater than 50 percent of waking hours.      Review / Management   Radiology Reports:      Pathology Reports:        Results review:  Last 10 Days Lab Results : Lab View   5/30/2019 11:16 CDT      Sodium Lvl                137 mmol/L                             Potassium Lvl             3.2 mmol/L  LOW                             Chloride                  99 mmol/L                             CO2                       28.0 mmol/L                             Calcium Lvl               8.7 mg/dL                             Glucose Lvl               208 mg/dL  HI                             BUN                       18.0 mg/dL                             Creatinine                0.61 mg/dL                             eGFR-AA                   >60 mL/min/1.73 m2  NA                             eGFR-NATA                  >60 mL/min/1.73 m2  NA                             Bili Total                0.6 mg/dL                             Bili Direct               0.30 mg/dL                              Bili Indirect             0.30 mg/dL                             AST                       60 unit/L  HI                             ALT                       26 unit/L                             Alk Phos                  96 unit/L                             Total Protein             5.4 gm/dL  LOW                             Albumin Lvl               1.70 gm/dL  LOW                             Globulin                  3.70 gm/dL  HI                             A/G Ratio                 0.5 ratio  LOW    5/30/2019 11:11 CDT      WBC                       8.3 x10(3)/mcL                             RBC                       3.64 x10(6)/mcL  LOW                             Hgb                       9.4 gm/dL  LOW                             Hct                       31.4 %  LOW                             Platelet                  238 x10(3)/mcL                             MCV                       86.3 fL                             MCH                       25.8 pg  LOW                             MCHC                      29.9 gm/dL  LOW                             RDW                       17.7 %  HI                             MPV                       8.5 fL  LOW                             Abs Neut                  7.32 x10(3)/mcL                             NEUT%                     88.0 %  NA                             NEUT#                     7.3 x10(3)/mcL                             LYMPH%                    5.3 %  NA                             LYMPH#                    0.4 x10(3)/mcL  LOW                             MONO%                     6.5 %  NA                             MONO#                     0.5 x10(3)/mcL    5/22/2019 11:26 CDT      WBC                       10.6 x10(3)/mcL                             RBC                       4.11 x10(6)/mcL  LOW                             Hgb                       10.6 gm/dL  LOW                             Hct                        35.3 %  LOW                             Platelet                  256 x10(3)/mcL                             MCV                       85.9 fL                             MCH                       25.8 pg  LOW                             MCHC                      30.0 gm/dL  LOW                             RDW                       16.9 %                             MPV                       8.8 fL  LOW                             Abs Neut                  8.94 x10(3)/mcL                             NEUT%                     84.7 %  NA                             NEUT#                     8.9 x10(3)/mcL                             LYMPH%                    8.2 %  NA                             LYMPH#                    0.9 x10(3)/mcL                             MONO%                     6.7 %  NA                             MONO#                     0.7 x10(3)/mcL                             BASO%                     0.1 %  NA                             BASO#                     0.0 x10(3)/mcL  .       Impression and Plan   Diagnosis     Anemia (MMD52-CQ D64.9).       IMPRESSION:    Anemia of chronic disease  M2A capsule study in August 2018 revealed AVM to stomach duodenum and jejunum.  RA  HTN  H/O CVA    PLAN  Bone marrow biopsy c/w Anemia of chronic disease.  She has hx of rheumatoid arthritis. Iron profile consistent with anemia of chronic disease.  Cont Aranesp 300 mcg p8pxehs for hgb <11  Cbc every 2 weeks.  RTC in 8 weeks with labs.     Transfuse for Hb < 8.5  Transfusion needs to be given slowly due to CHF and will need Lasix after  encourage to call for any questions or problems    All of the above discussed with the patient that was given ample opportunity to ask questions and they were all answered to satisfaction; patient demonstrated understanding of what we discussed and is agreeable to the plan.    BRIDGETT Pineda

## 2022-04-28 NOTE — PROGRESS NOTES
Patient:   Sylvia Palacios             MRN: [id number]            FIN: [id number]               Age:   89 years     Sex:  Female     :  1929   Associated Diagnoses:   Anemia   Author:   Laith CRISOSTOMO, Mayra KIM      Visit Information   Initial Consultation:18  Referring Physician:Dr Winters  Other Physicians:  Code Status:Not addressed    Diagnosis/Problem list:   Iron Deficiency Anemia    Present Treatment:    Treatment history:      Plan of care: Anemia w/u    Clinical History: Mrs. Palacios is an 89-year-old female referred by Dr. Winters for anemia. She has a past medical history of rheumatoid arthritis, coronary artery disease, hypertension and TIA.    In May of this year, she presented with left-sided chest pain and dark stools. Cardiac symptoms are negative. She was transferred from the [facility] to our [facility] for GI workup. She reported black stools during the month prior. She was taking oral iron which she had been on for years but the dark stools relatively new. She is also taking 325 mg of aspirin daily EGD that admission was normal and colonoscopy found moderate diverticulosis, no bleeding, masses or polyps seen. There were small external hemorrhoids. During that admission in May 2018, hemoglobin ranged from 7.6-8.6 g/dL and hemoglobin was 8.1 g/dL at the time of discharge on 2018. MCV was normal in the mid 80s. Iron level was 16, percent saturation 5%, iron binding capacity was normal at 355 and ferritin was normal at 30.3. She did receive Injectafer for infusion while in the hospital and received another one a week or so after discharge.    In early 2018, reportedly she started having dark stools again. Labs from 2018 show hemoglobin of 8.9 which was improved from hemoglobin of 8.1 g/dL when she was discharged from hospital one week prior to that. On 2018, the patient saw Dr. Schaefer's PA and the plan was to do M2A capsule study.     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     Denies any complaints at all. She is doing well "for my age". She denies any fever, chills, sweats. No chest pain or shortness of breath. She does report dark stools from time to time. No chest pain or shortness of breath. A little bit of fatigue and tiredness.      Initial workup:MM work up was negative. Lupus and RIKI both positive.   Peripheral smear: Moderate normocytic, normochromic anemia with little variation in red cell size and shape. Red cell morphologic variants include ovalocytes and acanthocytes. No schistocytes are seen. Probably from Kathy is present. Leukocytes are normal in number, differential and morphology. A few reactive lymphocytes are present.  The normal in number and morphology. Comment: Normocystosis in the setting of a normal RDW may be seen with anemia of chronic disease, some hemoglobinopathy traits acute bleed.      Chief Complaint   1/23/2019 11:12 CST      fatigue        Interval History   She presents today for six-month follow-up accompanied by her daughter. In the interim, she was admitted to [facility] for anemia and received blood transfusion. Will request these records. Today, her hemoglobin and hematocrit is 7.3 and 24. MCV is 76.7. Iron level is 14, iron saturation 6.8 and ferritin is 120.6.   She had an EGD with  M2A capsule study in August 2018. When we saw her 6 months ago, we did not have these results at that time. 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 She complains of fatigue. Denies dark stools and/or hematochezia at this time. She is very pale. She has decreased appetite except for "sweets".  Daughter states that her activity level has decreased a lot and she doesn't want to go anywhere anymore.    Oncology      Review of Systems   All 12 points of the review of systems were obtained and pertinent as per above history      Health Status   Allergies:    Allergic Reactions (All)  No Known Medication " Allergies,    Allergies (1) Active Reaction  No Known Medication Allergies None Documented   Current medications:  (Selected)   Documented Medications  Documented  HYDROCHLOROTHIAZIDE 12.5 MG TB: 12.5 mg = 1 tab(s), Oral, Daily  lisinopril 5 mg oral tablet: 0 Refill(s),   Reviewed   Problem list:    All Problems  Resolved: HTN - Hypertension / SNOMED CT 8200792753,    No qualifying data available      Histories   Past Medical History:    Resolved  HTN - Hypertension ([id number]):  Resolved.   Family History:    Diabetes mellitus type 2  Mother  ,   brother-throat cancer  daughter-lung cancer   Procedure history:    tnsillectomy - 12yo.   Social History        Social & Psychosocial Habits    Alcohol  08/09/2018  Use: Past    Tobacco  08/09/2018  Use: Never smoker.        Physical Examination   VS/Measurements   General:  Alert and oriented, Very pale. Generalized weakness., Uses walker to ambulate, Frail appearing elderly woman.    Eye:  Pupils are equal, round and reactive to light, Extraocular movements are intact, Normal conjunctiva.    HENT:  Normocephalic, Oral mucosa is moist, No pharyngeal erythema, No sinus tenderness.    Neck:  Supple, No jugular venous distention, No lymphadenopathy.    Respiratory:  Lungs are clear to auscultation, Respirations are non-labored.    Cardiovascular:  Normal rate, Regular rhythm, No murmur, No gallop, No edema.    Gastrointestinal:  Soft, Non-tender, Non-distended, Normal bowel sounds, No organomegaly.    Genitourinary:  No costovertebral angle tenderness, No inguinal tenderness.    Lymphatics:  No lymphadenopathy neck, axilla, groin.    Musculoskeletal:  Normal range of motion,      Mobility/ gait: Able to walk with a cane.    Integumentary:  Warm, Dry, Pallor.         Integumentary exam: Jaundiced, Pale.    Neurologic:  Alert, Oriented, No focal deficits, Cranial Nerves II-XII are grossly intact.    Cognition and Speech:  Oriented, Speech clear and coherent, Functional  cognition intact.    Psychiatric:  Cooperative, Appropriate mood & affect, Normal judgment.    ECOG Performance Scale: 1- Strenuous physical activity restricted; fully ambulatory and able to carry out light work.      Review / Management   Radiology Reports:      Pathology Reports:        Results review      Impression and Plan   Diagnosis     Anemia (IYD36-ET D64.9).       IMPRESSION:    Microcytic Anemia-iron deficiency  M2A capsule study in August 2018 revealed AVM to stomach duodenum and jejunum.  RA  HTN  H/O CVA    PLAN  Labs reveal microcytic anemia and iron deficiency likely secondary to GI bleeding. She has been evaluated by GI. Daughter states that due to her age, she is not a candidate for any intervention for the AVMs to her stomach duodenum and jejunum.  Transfuse 1 unit of PRBCs today and 1 unit of PRBCs tomorrow. We'll schedule her for Injectafer on 1/30/2019 and February 6, 2019.  Return to clinic in 4 weeks with repeat labs.    All of the above discussed with the patient that was given ample opportunity to ask questions and they were all answered to satisfaction; patient demonstrated understanding of what we discussed and is agreeable to the plan.    BRIDGETT Pineda

## 2022-04-28 NOTE — PROGRESS NOTES
Patient:   Sylvia Palacios             MRN: [id number]            FIN: [id number]               Age:   89 years     Sex:  Female     :  1929   Associated Diagnoses:   Anemia   Author:   Laith CRISOSTOMO, Mayra KIM      Visit Information   Initial Consultation:18  Referring Physician:Dr Winters  Other Physicians:  Code Status:Not addressed    Diagnosis/Problem list:   Iron Deficiency Anemia    Present Treatment:    Treatment history:      Plan of care: Anemia w/u    Clinical History: Mrs. Palacios is an 89-year-old female referred by Dr. Winters for anemia. She has a past medical history of rheumatoid arthritis, coronary artery disease, hypertension and TIA.    In May of this year, she presented with left-sided chest pain and dark stools. Cardiac symptoms are negative. She was transferred from the [facility] to our [facility] for GI workup. She reported black stools during the month prior. She was taking oral iron which she had been on for years but the dark stools relatively new. She is also taking 325 mg of aspirin daily EGD that admission was normal and colonoscopy found moderate diverticulosis, no bleeding, masses or polyps seen. There were small external hemorrhoids. During that admission in May 2018, hemoglobin ranged from 7.6-8.6 g/dL and hemoglobin was 8.1 g/dL at the time of discharge on 2018. MCV was normal in the mid 80s. Iron level was 16, percent saturation 5%, iron binding capacity was normal at 355 and ferritin was normal at 30.3. She did receive Injectafer for infusion while in the hospital and received another one a week or so after discharge.    In early 2018, reportedly she started having dark stools again. Labs from 2018 show hemoglobin of 8.9 which was improved from hemoglobin of 8.1 g/dL when she was discharged from hospital one week prior to that. On 2018, the patient saw Dr. Schaefer's PA and the plan was to do M2A capsule study.     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     Denies any complaints at all. She is doing well "for my age". She denies any fever, chills, sweats. No chest pain or shortness of breath. She does report dark stools from time to time. No chest pain or shortness of breath. A little bit of fatigue and tiredness.      Initial workup:MM work up was negative. Lupus and RIKI both positive.   Peripheral smear: Moderate normocytic, normochromic anemia with little variation in red cell size and shape. Red cell morphologic variants include ovalocytes and acanthocytes. No schistocytes are seen. Probably from Kathy is present. Leukocytes are normal in number, differential and morphology. A few reactive lymphocytes are present.  The normal in number and morphology. Comment: Normocystosis in the setting of a normal RDW may be seen with anemia of chronic disease, some hemoglobinopathy traits acute bleed.    She had an EGD with  M2A capsule study in August 2018. When we saw her initially, we did not have these results at that time. 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         Chief Complaint      Interval History   Patient presents today for 4 week follow-up accompanied by her daughter. At her last visit, she was transfused 2 units of PRBCs followed by Injectafer ×2 doses. Today, her hemoglobin is only 8.  She still complains of fatigue. 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 She does see minimal amounts of blood in her stool intermittently. Iron profile indicates anemia of chronic disease. She is very pale. She has decreased appetite except for "sweets".  Daughter states that her activity level has decreased a lot and she doesn't want to go anywhere anymore.    Oncology      Review of Systems   All 12 points of the review of systems were obtained and pertinent as per above history      Health Status   Allergies:    Allergic Reactions (All)  No Known Medication Allergies,    Allergies " (1) Active Reaction  No Known Medication Allergies None Documented   Current medications:  (Selected)   Inpatient Medications  Ordered  Tylenol: 650 mg, form: Tab, Oral, Once-chemo, first dose 01/24/19 12:00:00 CST, stop date 01/24/19 12:00:00 CST, Routine, Pre-Med for Transfusion, Days 1  Documented Medications  Documented  Calcitrate with D: 0 Refill(s)  TNF Medl (Template NonFormulary): 0 Refill(s)  lisinopril 5 mg oral tablet: 0 Refill(s),   Reviewed   Problem list:    All Problems  Review of care plan / SNOMED CT 5882527614 / Confirmed  Resolved: HTN - Hypertension / SNOMED CT 3789194619,    Active Problems (1)  Review of care plan       Histories   Past Medical History:    Resolved  HTN - Hypertension (119359):  Resolved.   Family History:    Diabetes mellitus type 2  Mother  ,   brother-throat cancer  daughter-lung cancer   Procedure history:    tnsillectomy - 12yo.   Social History        Social & Psychosocial Habits    Alcohol  08/09/2018  Use: Past    Tobacco  08/09/2018  Use: Never smoker    01/30/2019  Use: Never (less than 100 in l    Patient Wants Consult For Cessation Counseling N/A    02/06/2019  Use: Never (less than 100 in l    Patient Wants Consult For Cessation Counseling No.        Physical Examination   Vital Signs   2/20/2019 10:54 CST      Peripheral Pulse Rate     89 bpm                             Systolic Blood Pressure   131 mmHg                             Diastolic Blood Pressure  56 mmHg  LOW     Measurements from flowsheet : Measurements   2/20/2019 10:54 CST      Weight Dosing             44.3 kg                             Weight Measured           44.3 kg                             Weight Measured and Calculated in Lbs     97.66 lb                             Height/Length Dosing      149.8 cm                             Height/Length Measured    149.8 cm                             BSA Measured              1.36 m2                             Body Mass Index Measured  19.74  kg/m2     General:  Alert and oriented, Very pale. Generalized weakness., Uses walker to ambulate, Frail appearing elderly woman.    Eye:  Pupils are equal, round and reactive to light, Extraocular movements are intact, Normal conjunctiva.    HENT:  Normocephalic, Oral mucosa is moist, No pharyngeal erythema, No sinus tenderness.    Neck:  Supple, No jugular venous distention, No lymphadenopathy.    Respiratory:  Lungs are clear to auscultation, Respirations are non-labored.    Cardiovascular:  Normal rate, Regular rhythm, No murmur, No gallop, No edema.    Gastrointestinal:  Soft, Non-tender, Non-distended, Normal bowel sounds, No organomegaly.    Genitourinary:  No costovertebral angle tenderness, No inguinal tenderness.    Lymphatics:  No lymphadenopathy neck, axilla, groin.    Musculoskeletal:  Normal range of motion,      Mobility/ gait: Able to walk with a cane.    Integumentary:  Warm, Dry, Pallor.         Integumentary exam: Jaundiced, Pale.    Neurologic:  Alert, Oriented, No focal deficits, Cranial Nerves II-XII are grossly intact.    Cognition and Speech:  Oriented, Speech clear and coherent, Functional cognition intact.    Psychiatric:  Cooperative, Appropriate mood & affect, Normal judgment.    ECOG Performance Scale: 1- Strenuous physical activity restricted; fully ambulatory and able to carry out light work.      Review / Management   Radiology Reports:      Pathology Reports:        Results review      Impression and Plan   Diagnosis     Anemia ([id number] D64.9).       IMPRESSION:    Microcytic Anemia-iron deficiency  M2A capsule study in August 2018 revealed AVM to stomach duodenum and jejunum.  RA  HTN  H/O CVA    PLAN  Labs normocytic anemia today. Injectafer x 2 doses last month did not improve her anemia so we will proceed with bone marrow biopsy. We would need dx of MDS documented in order to give Procrit or Aranesp. Her kidney function is normal.   Transfuse 1 unit of PRBCs today.   Return to  clinic in 2 weeks with repeat labs for bone marrow biopsy results.     All of the above discussed with the patient that was given ample opportunity to ask questions and they were all answered to satisfaction; patient demonstrated understanding of what we discussed and is agreeable to the plan.    BRIDGETT Pineda